# Patient Record
Sex: FEMALE | URBAN - METROPOLITAN AREA
[De-identification: names, ages, dates, MRNs, and addresses within clinical notes are randomized per-mention and may not be internally consistent; named-entity substitution may affect disease eponyms.]

---

## 2019-02-16 ENCOUNTER — EMERGENCY (EMERGENCY)
Facility: HOSPITAL | Age: 49
LOS: 1 days | Discharge: HOME | End: 2019-02-16
Attending: EMERGENCY MEDICINE
Payer: SELF-PAY

## 2019-02-16 VITALS
SYSTOLIC BLOOD PRESSURE: 121 MMHG | TEMPERATURE: 99 F | DIASTOLIC BLOOD PRESSURE: 71 MMHG | RESPIRATION RATE: 17 BRPM | HEART RATE: 99 BPM | OXYGEN SATURATION: 97 %

## 2019-02-16 VITALS
RESPIRATION RATE: 17 BRPM | OXYGEN SATURATION: 96 % | WEIGHT: 190.04 LBS | HEART RATE: 132 BPM | TEMPERATURE: 98 F | SYSTOLIC BLOOD PRESSURE: 90 MMHG | DIASTOLIC BLOOD PRESSURE: 53 MMHG | HEIGHT: 63 IN

## 2019-02-16 PROCEDURE — 99284 EMERGENCY DEPT VISIT MOD MDM: CPT | Mod: 25

## 2019-02-16 PROCEDURE — 96374 THER/PROPH/DIAG INJ IV PUSH: CPT

## 2019-02-16 PROCEDURE — 99285 EMERGENCY DEPT VISIT HI MDM: CPT

## 2019-02-16 PROCEDURE — 96375 TX/PRO/DX INJ NEW DRUG ADDON: CPT

## 2019-02-16 RX ORDER — SUCRALFATE 1 G
1 TABLET ORAL ONCE
Qty: 0 | Refills: 0 | Status: DISCONTINUED | OUTPATIENT
Start: 2019-02-16 | End: 2019-02-20

## 2019-02-16 RX ORDER — DIPHENHYDRAMINE HCL 50 MG
50 CAPSULE ORAL ONCE
Qty: 0 | Refills: 0 | Status: COMPLETED | OUTPATIENT
Start: 2019-02-16 | End: 2019-02-16

## 2019-02-16 RX ORDER — FAMOTIDINE 10 MG/ML
20 INJECTION INTRAVENOUS ONCE
Qty: 0 | Refills: 0 | Status: COMPLETED | OUTPATIENT
Start: 2019-02-16 | End: 2019-02-16

## 2019-02-16 RX ADMIN — Medication 125 MILLIGRAM(S): at 12:21

## 2019-02-16 RX ADMIN — Medication 50 MILLIGRAM(S): at 12:21

## 2019-02-16 RX ADMIN — FAMOTIDINE 20 MILLIGRAM(S): 10 INJECTION INTRAVENOUS at 12:21

## 2019-02-16 NOTE — ED ADULT NURSE NOTE - NSIMPLEMENTINTERV_GEN_ALL_ED
Implemented All Universal Safety Interventions:  [place] to call system. Call bell, personal items and telephone within reach. Instruct patient to call for assistance. Room bathroom lighting operational. Non-slip footwear when patient is off stretcher. Physically safe environment: no spills, clutter or unnecessary equipment. Stretcher in lowest position, wheels locked, appropriate side rails in place.

## 2019-02-16 NOTE — ED PROVIDER NOTE - CLINICAL SUMMARY MEDICAL DECISION MAKING FREE TEXT BOX
47 y/o F patient with an allergic reaction. Will do labs. Will give Solumedrol, Benadryl, Peptid and reassess.

## 2019-02-16 NOTE — ED ADULT TRIAGE NOTE - CHIEF COMPLAINT QUOTE
throat itchiness and rash all over the body, started yesterday morning, took benadryl and not working.

## 2019-02-16 NOTE — ED PROVIDER NOTE - PROGRESS NOTE DETAILS
On re-evaluation, Pt is well appearing, in NAD. pt breathing easily, moving air well. On re-evaluation, Pt is well appearing, in NAD. pt breathing easily, moving air well. tolerating po. vitals stable.  instructions given to follow up with primary physician in 1-2 days, return to ED for worsening condition. pt expresses understanding.

## 2019-02-16 NOTE — ED PROVIDER NOTE - OBJECTIVE STATEMENT
49 y/o F patient with no significant PMHx and no significant PSHx presents to the ED with c/o allergic reaction to Charles seeds today. Patient has diffused face and leg swelling. Patient is speaking with full sentences. Allergies: NKDA. 47 y/o F patient with no significant PMHx and no significant PSHx presents to the ED with c/o allergic reaction to Charles seeds today. Patient has diffused face rash and sensation of swelling as well as  rash on legs bilaterally and abdomen. Patient is speaking with full sentences. Pt breathing easily.  Allergies: NKDA.

## 2019-02-16 NOTE — ED PROVIDER NOTE - SKIN RASH DESCRIPTION
erythematous, blanching arms, abdomen, and legs/BLANCHING erythematous, blanching arms, abdomen, and legs/MACULAR/BLANCHING/PATCHY AREAS

## 2019-02-18 ENCOUNTER — EMERGENCY (EMERGENCY)
Facility: HOSPITAL | Age: 49
LOS: 1 days | Discharge: HOME | End: 2019-02-18
Attending: EMERGENCY MEDICINE
Payer: SELF-PAY

## 2019-02-18 VITALS
DIASTOLIC BLOOD PRESSURE: 70 MMHG | TEMPERATURE: 98 F | HEART RATE: 78 BPM | SYSTOLIC BLOOD PRESSURE: 130 MMHG | RESPIRATION RATE: 16 BRPM | OXYGEN SATURATION: 99 %

## 2019-02-18 VITALS
DIASTOLIC BLOOD PRESSURE: 86 MMHG | HEIGHT: 63 IN | WEIGHT: 190.04 LBS | OXYGEN SATURATION: 98 % | TEMPERATURE: 98 F | RESPIRATION RATE: 18 BRPM | SYSTOLIC BLOOD PRESSURE: 146 MMHG | HEART RATE: 89 BPM

## 2019-02-18 PROCEDURE — 99283 EMERGENCY DEPT VISIT LOW MDM: CPT

## 2019-02-18 RX ORDER — DIPHENHYDRAMINE HCL 50 MG
50 CAPSULE ORAL ONCE
Qty: 0 | Refills: 0 | Status: COMPLETED | OUTPATIENT
Start: 2019-02-18 | End: 2019-02-18

## 2019-02-18 RX ORDER — LORATADINE 10 MG/1
20 TABLET ORAL ONCE
Qty: 0 | Refills: 0 | Status: DISCONTINUED | OUTPATIENT
Start: 2019-02-18 | End: 2019-02-22

## 2019-02-18 RX ADMIN — Medication 50 MILLIGRAM(S): at 12:42

## 2019-02-18 NOTE — ED PROVIDER NOTE - CLINICAL SUMMARY MEDICAL DECISION MAKING FREE TEXT BOX
Give high dose of Zyrtec. See progress note for paper that describes the safety approach. Give high dose of Zyrtec. See progress note for paper that describes the safety of approach. refer to allergist

## 2019-02-18 NOTE — ED ADULT NURSE NOTE - OBJECTIVE STATEMENT
itchiness for 3 days, taking prednisone and benadryl as prescribed with relief for only 2 hrs than itchiness starts again, denies any shortness of breath.

## 2019-02-18 NOTE — ED PROVIDER NOTE - OBJECTIVE STATEMENT
49 y/o F with no PMHx or PSHx presents to ED c/o hives. Seen yesterday was DC on benadryl and steroids. Pt now endorses rash onset and some difficulty breathing. Pt now has a non-itchy rash with itchy eyes. Last took steroid overnight with benadryl NKDA.

## 2019-02-18 NOTE — ED ADULT NURSE NOTE - NSIMPLEMENTINTERV_GEN_ALL_ED
Implemented All Universal Safety Interventions:  [place] to call system. Call bell, personal items and telephone within reach. Instruct patient to call for assistance. Room bathroom lighting operational. Non-slip footwear when patient is off stretcher. Physically safe environment: no spills, clutter or unnecessary equipment. Stretcher in lowest position, wheels locked, appropriate side rails in place.

## 2020-04-06 NOTE — ED ADULT TRIAGE NOTE - CHIEF COMPLAINT QUOTE
Spoke with patient, got him scheduled for a video visit tomorrow to discuss shortness of breath. He will be downloading the Fingerprint faye today.    Gen body urticaria since 3 days

## 2020-08-18 NOTE — ED ADULT TRIAGE NOTE - BP NONINVASIVE SYSTOLIC (MM HG)
----- Message from Kandace Benedicto sent at 8/18/2020 11:52 AM CDT -----  Regarding: labs  Type: Patient Call Back    Who called:pt    What is the request in detail:pt is calling to request lab work for est care. Call pt    Can the clinic reply by [facility]?    Would the patient rather a call back or a response via [facility]? call    Best call back [phone] (home)     Additional Information:        
Labs ordered  Please schedule    
Labs scheduled.   
Patient is new to this clinic  Does patient have any medical conditions that I need to look out for or just regular labs?    
Patient requesting lab orders, please advise.   
Spoke to patient and he stated it is more for a overall work up. Just want to make sure his cholesterol and blood is ok requested labs are pended for approval. Please advise  
[phone]

## 2021-05-06 ENCOUNTER — EMERGENCY (EMERGENCY)
Facility: HOSPITAL | Age: 51
LOS: 1 days | Discharge: HOME | End: 2021-05-06
Attending: EMERGENCY MEDICINE
Payer: COMMERCIAL

## 2021-05-06 VITALS
DIASTOLIC BLOOD PRESSURE: 77 MMHG | OXYGEN SATURATION: 100 % | RESPIRATION RATE: 18 BRPM | HEART RATE: 70 BPM | SYSTOLIC BLOOD PRESSURE: 136 MMHG | TEMPERATURE: 98 F

## 2021-05-06 VITALS
OXYGEN SATURATION: 97 % | TEMPERATURE: 99 F | WEIGHT: 176.81 LBS | RESPIRATION RATE: 20 BRPM | HEART RATE: 90 BPM | DIASTOLIC BLOOD PRESSURE: 85 MMHG | SYSTOLIC BLOOD PRESSURE: 152 MMHG | HEIGHT: 63 IN

## 2021-05-06 LAB
ALBUMIN SERPL ELPH-MCNC: 3.4 G/DL — LOW (ref 3.5–5)
ALP SERPL-CCNC: 104 U/L — SIGNIFICANT CHANGE UP (ref 40–120)
ALT FLD-CCNC: 18 U/L DA — SIGNIFICANT CHANGE UP (ref 10–60)
ANION GAP SERPL CALC-SCNC: 7 MMOL/L — SIGNIFICANT CHANGE UP (ref 5–17)
AST SERPL-CCNC: 17 U/L — SIGNIFICANT CHANGE UP (ref 10–40)
BILIRUB SERPL-MCNC: 0.3 MG/DL — SIGNIFICANT CHANGE UP (ref 0.2–1.2)
BUN SERPL-MCNC: 10 MG/DL — SIGNIFICANT CHANGE UP (ref 7–18)
CALCIUM SERPL-MCNC: 9 MG/DL — SIGNIFICANT CHANGE UP (ref 8.4–10.5)
CHLORIDE SERPL-SCNC: 107 MMOL/L — SIGNIFICANT CHANGE UP (ref 96–108)
CO2 SERPL-SCNC: 26 MMOL/L — SIGNIFICANT CHANGE UP (ref 22–31)
CREAT SERPL-MCNC: 0.61 MG/DL — SIGNIFICANT CHANGE UP (ref 0.5–1.3)
GLUCOSE SERPL-MCNC: 130 MG/DL — HIGH (ref 70–99)
HCG UR QL: NEGATIVE — SIGNIFICANT CHANGE UP
HCT VFR BLD CALC: 37.8 % — SIGNIFICANT CHANGE UP (ref 34.5–45)
HGB BLD-MCNC: 13 G/DL — SIGNIFICANT CHANGE UP (ref 11.5–15.5)
MCHC RBC-ENTMCNC: 29.7 PG — SIGNIFICANT CHANGE UP (ref 27–34)
MCHC RBC-ENTMCNC: 34.4 GM/DL — SIGNIFICANT CHANGE UP (ref 32–36)
MCV RBC AUTO: 86.3 FL — SIGNIFICANT CHANGE UP (ref 80–100)
NRBC # BLD: 0 /100 WBCS — SIGNIFICANT CHANGE UP (ref 0–0)
PLATELET # BLD AUTO: 254 K/UL — SIGNIFICANT CHANGE UP (ref 150–400)
POTASSIUM SERPL-MCNC: 4 MMOL/L — SIGNIFICANT CHANGE UP (ref 3.5–5.3)
POTASSIUM SERPL-SCNC: 4 MMOL/L — SIGNIFICANT CHANGE UP (ref 3.5–5.3)
PROT SERPL-MCNC: 8 G/DL — SIGNIFICANT CHANGE UP (ref 6–8.3)
RBC # BLD: 4.38 M/UL — SIGNIFICANT CHANGE UP (ref 3.8–5.2)
RBC # FLD: 13.4 % — SIGNIFICANT CHANGE UP (ref 10.3–14.5)
SODIUM SERPL-SCNC: 140 MMOL/L — SIGNIFICANT CHANGE UP (ref 135–145)
WBC # BLD: 9.71 K/UL — SIGNIFICANT CHANGE UP (ref 3.8–10.5)
WBC # FLD AUTO: 9.71 K/UL — SIGNIFICANT CHANGE UP (ref 3.8–10.5)

## 2021-05-06 PROCEDURE — 96375 TX/PRO/DX INJ NEW DRUG ADDON: CPT

## 2021-05-06 PROCEDURE — 99284 EMERGENCY DEPT VISIT MOD MDM: CPT

## 2021-05-06 PROCEDURE — 96374 THER/PROPH/DIAG INJ IV PUSH: CPT

## 2021-05-06 PROCEDURE — 36415 COLL VENOUS BLD VENIPUNCTURE: CPT

## 2021-05-06 PROCEDURE — 96361 HYDRATE IV INFUSION ADD-ON: CPT

## 2021-05-06 PROCEDURE — 85027 COMPLETE CBC AUTOMATED: CPT

## 2021-05-06 PROCEDURE — 81025 URINE PREGNANCY TEST: CPT

## 2021-05-06 PROCEDURE — 99284 EMERGENCY DEPT VISIT MOD MDM: CPT | Mod: 25

## 2021-05-06 PROCEDURE — 80053 COMPREHEN METABOLIC PANEL: CPT

## 2021-05-06 RX ORDER — DEXAMETHASONE 0.5 MG/5ML
4 ELIXIR ORAL ONCE
Refills: 0 | Status: COMPLETED | OUTPATIENT
Start: 2021-05-06 | End: 2021-05-06

## 2021-05-06 RX ORDER — FAMOTIDINE 10 MG/ML
20 INJECTION INTRAVENOUS ONCE
Refills: 0 | Status: COMPLETED | OUTPATIENT
Start: 2021-05-06 | End: 2021-05-06

## 2021-05-06 RX ORDER — DIPHENHYDRAMINE HCL 50 MG
25 CAPSULE ORAL ONCE
Refills: 0 | Status: COMPLETED | OUTPATIENT
Start: 2021-05-06 | End: 2021-05-06

## 2021-05-06 RX ORDER — HYDROXYZINE HCL 10 MG
1 TABLET ORAL
Qty: 14 | Refills: 0
Start: 2021-05-06 | End: 2021-05-19

## 2021-05-06 RX ORDER — SODIUM CHLORIDE 9 MG/ML
1000 INJECTION INTRAMUSCULAR; INTRAVENOUS; SUBCUTANEOUS ONCE
Refills: 0 | Status: COMPLETED | OUTPATIENT
Start: 2021-05-06 | End: 2021-05-06

## 2021-05-06 RX ORDER — LORATADINE 10 MG/1
1 TABLET ORAL
Qty: 14 | Refills: 0
Start: 2021-05-06 | End: 2021-05-19

## 2021-05-06 RX ORDER — MOMETASONE FUROATE 1 MG/G
1 CREAM TOPICAL
Qty: 15 | Refills: 0
Start: 2021-05-06 | End: 2021-05-19

## 2021-05-06 RX ORDER — FAMOTIDINE 10 MG/ML
1 INJECTION INTRAVENOUS
Qty: 28 | Refills: 0
Start: 2021-05-06 | End: 2021-05-19

## 2021-05-06 RX ADMIN — SODIUM CHLORIDE 1000 MILLILITER(S): 9 INJECTION INTRAMUSCULAR; INTRAVENOUS; SUBCUTANEOUS at 14:28

## 2021-05-06 RX ADMIN — Medication 4 MILLIGRAM(S): at 13:10

## 2021-05-06 RX ADMIN — FAMOTIDINE 20 MILLIGRAM(S): 10 INJECTION INTRAVENOUS at 13:09

## 2021-05-06 RX ADMIN — SODIUM CHLORIDE 1000 MILLILITER(S): 9 INJECTION INTRAMUSCULAR; INTRAVENOUS; SUBCUTANEOUS at 13:10

## 2021-05-06 RX ADMIN — Medication 25 MILLIGRAM(S): at 13:10

## 2021-05-06 NOTE — ED PROVIDER NOTE - CLINICAL SUMMARY MEDICAL DECISION MAKING FREE TEXT BOX
IV hydration, basic labs, anti allergy medications and reevaluate IV hydration, basic labs, anti allergy medications and reevaluate.will dc pt on antihistamine.will follow up arranged by [facility] coordinator. allergist in 2 weeks

## 2021-05-06 NOTE — ED ADULT NURSE NOTE - OBJECTIVE STATEMENT
AOX4 +ambulatory patient reports she just moved to a new apt complaining of swelling of the face no sob speaking in full sentences

## 2021-05-06 NOTE — ED PROVIDER NOTE - OBJECTIVE STATEMENT
50 y.o female with no PMhx and no PSHx presents to the ED with recurrent itching and swelling of face, upper and lower eyelids for x5 days when she moved into her new apartment. Patient endorses she had a similar episode x1 year ago when she moved into a different apartment. Patient states she used OTC antihistamine medication with some relief. Patient denies any chest pain , shortness of breath, swelling of tongue or lips or any other acute complaints. NKDA

## 2021-05-06 NOTE — ED PROVIDER NOTE - PATIENT PORTAL LINK FT
You can access the FollowMyHealth Patient Portal offered by [facility] by registering at the following website: [email or web address]. By joining [employer]’s FollowMyHealth portal, you will also be able to view your health information using other applications (apps) compatible with our system.

## 2021-05-06 NOTE — ED ADULT TRIAGE NOTE - CHIEF COMPLAINT QUOTE
PT REPORTS MOVED INTO NEW APT ON SATURDAY. C/O SWELLING OF FACE, EYES AND ITCHING OF THROAT AND EYES X 4 DAYS

## 2024-02-17 ENCOUNTER — EMERGENCY (EMERGENCY)
Facility: HOSPITAL | Age: 54
LOS: 1 days | Discharge: HOME | End: 2024-02-17
Attending: STUDENT IN AN ORGANIZED HEALTH CARE EDUCATION/TRAINING PROGRAM | Admitting: STUDENT IN AN ORGANIZED HEALTH CARE EDUCATION/TRAINING PROGRAM
Payer: COMMERCIAL

## 2024-02-17 VITALS
SYSTOLIC BLOOD PRESSURE: 176 MMHG | TEMPERATURE: 98 F | RESPIRATION RATE: 16 BRPM | OXYGEN SATURATION: 100 % | DIASTOLIC BLOOD PRESSURE: 11 MMHG | HEART RATE: 99 BPM

## 2024-02-17 VITALS
TEMPERATURE: 98 F | DIASTOLIC BLOOD PRESSURE: 81 MMHG | RESPIRATION RATE: 16 BRPM | OXYGEN SATURATION: 100 % | HEART RATE: 90 BPM | SYSTOLIC BLOOD PRESSURE: 144 MMHG

## 2024-02-17 LAB
ALBUMIN SERPL ELPH-MCNC: 4.4 G/DL — SIGNIFICANT CHANGE UP (ref 3.3–5)
ALP SERPL-CCNC: 104 U/L — SIGNIFICANT CHANGE UP (ref 40–120)
ALT FLD-CCNC: 12 U/L — SIGNIFICANT CHANGE UP (ref 4–33)
ANION GAP SERPL CALC-SCNC: 14 MMOL/L — SIGNIFICANT CHANGE UP (ref 7–14)
APPEARANCE UR: CLEAR — SIGNIFICANT CHANGE UP
AST SERPL-CCNC: 15 U/L — SIGNIFICANT CHANGE UP (ref 4–32)
B BURGDOR C6 AB SER-ACNC: NEGATIVE — SIGNIFICANT CHANGE UP
B BURGDOR IGG+IGM SER-ACNC: 0.12 INDEX — SIGNIFICANT CHANGE UP (ref 0.01–0.89)
BACTERIA # UR AUTO: NEGATIVE /HPF — SIGNIFICANT CHANGE UP
BASE EXCESS BLDV CALC-SCNC: 2.9 MMOL/L — SIGNIFICANT CHANGE UP (ref -2–3)
BASOPHILS # BLD AUTO: 0.03 K/UL — SIGNIFICANT CHANGE UP (ref 0–0.2)
BASOPHILS NFR BLD AUTO: 0.3 % — SIGNIFICANT CHANGE UP (ref 0–2)
BILIRUB SERPL-MCNC: 0.4 MG/DL — SIGNIFICANT CHANGE UP (ref 0.2–1.2)
BILIRUB UR-MCNC: NEGATIVE — SIGNIFICANT CHANGE UP
BLOOD GAS VENOUS COMPREHENSIVE RESULT: SIGNIFICANT CHANGE UP
BUN SERPL-MCNC: 8 MG/DL — SIGNIFICANT CHANGE UP (ref 7–23)
CALCIUM SERPL-MCNC: 9.2 MG/DL — SIGNIFICANT CHANGE UP (ref 8.4–10.5)
CAST: 1 /LPF — SIGNIFICANT CHANGE UP (ref 0–4)
CHLORIDE BLDV-SCNC: 102 MMOL/L — SIGNIFICANT CHANGE UP (ref 96–108)
CHLORIDE SERPL-SCNC: 100 MMOL/L — SIGNIFICANT CHANGE UP (ref 98–107)
CO2 BLDV-SCNC: 31.5 MMOL/L — HIGH (ref 22–26)
CO2 SERPL-SCNC: 26 MMOL/L — SIGNIFICANT CHANGE UP (ref 22–31)
COLOR SPEC: YELLOW — SIGNIFICANT CHANGE UP
CREAT SERPL-MCNC: 0.59 MG/DL — SIGNIFICANT CHANGE UP (ref 0.5–1.3)
CRP SERPL-MCNC: 10.6 MG/L — HIGH
DIFF PNL FLD: NEGATIVE — SIGNIFICANT CHANGE UP
EGFR: 109 ML/MIN/1.73M2 — SIGNIFICANT CHANGE UP
EOSINOPHIL # BLD AUTO: 0.06 K/UL — SIGNIFICANT CHANGE UP (ref 0–0.5)
EOSINOPHIL NFR BLD AUTO: 0.5 % — SIGNIFICANT CHANGE UP (ref 0–6)
ERYTHROCYTE [SEDIMENTATION RATE] IN BLOOD: 12 MM/HR — SIGNIFICANT CHANGE UP (ref 4–25)
GAS PNL BLDV: 137 MMOL/L — SIGNIFICANT CHANGE UP (ref 136–145)
GLUCOSE BLDV-MCNC: 206 MG/DL — HIGH (ref 70–99)
GLUCOSE SERPL-MCNC: 198 MG/DL — HIGH (ref 70–99)
GLUCOSE UR QL: NEGATIVE MG/DL — SIGNIFICANT CHANGE UP
HCO3 BLDV-SCNC: 30 MMOL/L — HIGH (ref 22–29)
HCT VFR BLD CALC: 41.1 % — SIGNIFICANT CHANGE UP (ref 34.5–45)
HCT VFR BLDA CALC: 43 % — SIGNIFICANT CHANGE UP (ref 34.5–46.5)
HGB BLD CALC-MCNC: 14.3 G/DL — SIGNIFICANT CHANGE UP (ref 11.7–16.1)
HGB BLD-MCNC: 13.8 G/DL — SIGNIFICANT CHANGE UP (ref 11.5–15.5)
IANC: 7.55 K/UL — HIGH (ref 1.8–7.4)
IMM GRANULOCYTES NFR BLD AUTO: 0.4 % — SIGNIFICANT CHANGE UP (ref 0–0.9)
KETONES UR-MCNC: 15 MG/DL
LACTATE BLDV-MCNC: 2.3 MMOL/L — HIGH (ref 0.5–2)
LEUKOCYTE ESTERASE UR-ACNC: NEGATIVE — SIGNIFICANT CHANGE UP
LYMPHOCYTES # BLD AUTO: 2.85 K/UL — SIGNIFICANT CHANGE UP (ref 1–3.3)
LYMPHOCYTES # BLD AUTO: 25.4 % — SIGNIFICANT CHANGE UP (ref 13–44)
MCHC RBC-ENTMCNC: 27.9 PG — SIGNIFICANT CHANGE UP (ref 27–34)
MCHC RBC-ENTMCNC: 33.6 GM/DL — SIGNIFICANT CHANGE UP (ref 32–36)
MCV RBC AUTO: 83.2 FL — SIGNIFICANT CHANGE UP (ref 80–100)
MONOCYTES # BLD AUTO: 0.69 K/UL — SIGNIFICANT CHANGE UP (ref 0–0.9)
MONOCYTES NFR BLD AUTO: 6.1 % — SIGNIFICANT CHANGE UP (ref 2–14)
NEUTROPHILS # BLD AUTO: 7.55 K/UL — HIGH (ref 1.8–7.4)
NEUTROPHILS NFR BLD AUTO: 67.3 % — SIGNIFICANT CHANGE UP (ref 43–77)
NITRITE UR-MCNC: NEGATIVE — SIGNIFICANT CHANGE UP
NRBC # BLD: 0 /100 WBCS — SIGNIFICANT CHANGE UP (ref 0–0)
NRBC # FLD: 0 K/UL — SIGNIFICANT CHANGE UP (ref 0–0)
PCO2 BLDV: 54 MMHG — HIGH (ref 39–52)
PH BLDV: 7.35 — SIGNIFICANT CHANGE UP (ref 7.32–7.43)
PH UR: 6.5 — SIGNIFICANT CHANGE UP (ref 5–8)
PLATELET # BLD AUTO: 259 K/UL — SIGNIFICANT CHANGE UP (ref 150–400)
PO2 BLDV: <20 MMHG — LOW (ref 25–45)
POTASSIUM BLDV-SCNC: 4.8 MMOL/L — SIGNIFICANT CHANGE UP (ref 3.5–5.1)
POTASSIUM SERPL-MCNC: 4.8 MMOL/L — SIGNIFICANT CHANGE UP (ref 3.5–5.3)
POTASSIUM SERPL-SCNC: 4.8 MMOL/L — SIGNIFICANT CHANGE UP (ref 3.5–5.3)
PROT SERPL-MCNC: 8.2 G/DL — SIGNIFICANT CHANGE UP (ref 6–8.3)
PROT UR-MCNC: 30 MG/DL
RBC # BLD: 4.94 M/UL — SIGNIFICANT CHANGE UP (ref 3.8–5.2)
RBC # FLD: 13.2 % — SIGNIFICANT CHANGE UP (ref 10.3–14.5)
RBC CASTS # UR COMP ASSIST: 1 /HPF — SIGNIFICANT CHANGE UP (ref 0–4)
SAO2 % BLDV: 27.3 % — LOW (ref 67–88)
SODIUM SERPL-SCNC: 140 MMOL/L — SIGNIFICANT CHANGE UP (ref 135–145)
SP GR SPEC: 1.01 — SIGNIFICANT CHANGE UP (ref 1–1.03)
SQUAMOUS # UR AUTO: 3 /HPF — SIGNIFICANT CHANGE UP (ref 0–5)
UROBILINOGEN FLD QL: 0.2 MG/DL — SIGNIFICANT CHANGE UP (ref 0.2–1)
WBC # BLD: 11.22 K/UL — HIGH (ref 3.8–10.5)
WBC # FLD AUTO: 11.22 K/UL — HIGH (ref 3.8–10.5)
WBC UR QL: 1 /HPF — SIGNIFICANT CHANGE UP (ref 0–5)

## 2024-02-17 PROCEDURE — 70470 CT HEAD/BRAIN W/O & W/DYE: CPT | Mod: 26,MA

## 2024-02-17 PROCEDURE — 99285 EMERGENCY DEPT VISIT HI MDM: CPT

## 2024-02-17 RX ORDER — ACETAMINOPHEN 500 MG
1000 TABLET ORAL ONCE
Refills: 0 | Status: COMPLETED | OUTPATIENT
Start: 2024-02-17 | End: 2024-02-17

## 2024-02-17 RX ORDER — VALACYCLOVIR 500 MG/1
1 TABLET, FILM COATED ORAL
Qty: 21 | Refills: 0
Start: 2024-02-17 | End: 2024-02-23

## 2024-02-17 RX ORDER — KETOROLAC TROMETHAMINE 30 MG/ML
15 SYRINGE (ML) INJECTION ONCE
Refills: 0 | Status: DISCONTINUED | OUTPATIENT
Start: 2024-02-17 | End: 2024-02-17

## 2024-02-17 RX ORDER — SODIUM CHLORIDE 9 MG/ML
1000 INJECTION INTRAMUSCULAR; INTRAVENOUS; SUBCUTANEOUS ONCE
Refills: 0 | Status: COMPLETED | OUTPATIENT
Start: 2024-02-17 | End: 2024-02-17

## 2024-02-17 RX ORDER — VALACYCLOVIR 500 MG/1
1000 TABLET, FILM COATED ORAL ONCE
Refills: 0 | Status: COMPLETED | OUTPATIENT
Start: 2024-02-17 | End: 2024-02-17

## 2024-02-17 RX ORDER — METOCLOPRAMIDE HCL 10 MG
10 TABLET ORAL ONCE
Refills: 0 | Status: COMPLETED | OUTPATIENT
Start: 2024-02-17 | End: 2024-02-17

## 2024-02-17 RX ADMIN — Medication 10 MILLIGRAM(S): at 13:28

## 2024-02-17 RX ADMIN — Medication 15 MILLIGRAM(S): at 13:26

## 2024-02-17 RX ADMIN — VALACYCLOVIR 1000 MILLIGRAM(S): 500 TABLET, FILM COATED ORAL at 13:39

## 2024-02-17 RX ADMIN — SODIUM CHLORIDE 1000 MILLILITER(S): 9 INJECTION INTRAMUSCULAR; INTRAVENOUS; SUBCUTANEOUS at 10:28

## 2024-02-17 RX ADMIN — Medication 15 MILLIGRAM(S): at 14:15

## 2024-02-17 RX ADMIN — SODIUM CHLORIDE 1000 MILLILITER(S): 9 INJECTION INTRAMUSCULAR; INTRAVENOUS; SUBCUTANEOUS at 11:09

## 2024-02-17 RX ADMIN — Medication 1000 MILLIGRAM(S): at 11:09

## 2024-02-17 RX ADMIN — Medication 60 MILLIGRAM(S): at 10:10

## 2024-02-17 RX ADMIN — Medication 400 MILLIGRAM(S): at 10:10

## 2024-02-17 NOTE — ED ADULT TRIAGE NOTE - CHIEF COMPLAINT QUOTE
c/o left sided headache x 4 days. States yesterday right eye swelling and left eye tearing. Pt unable to blink left eye. Fingerstick 193. Denies any PHx. c/o left sided headache x 4 days. States yesterday right eye swelling and left eye tearing. Pt unable to blink left eye. Fingerstick 193. Denies any PHx.Dr. Garzon came to triage to [facility]. No stroke code activated. Possible [place] Palsy.

## 2024-02-17 NOTE — ED PROVIDER NOTE - PATIENT PORTAL LINK FT
You can access the FollowMyHealth Patient Portal offered by [facility] by registering at the following website: [email or web address]. By joining [employer]’s FollowMyHealth portal, you will also be able to view your health information using other applications (apps) compatible with our system.

## 2024-02-17 NOTE — ED ADULT NURSE NOTE - OBJECTIVE STATEMENT
pt alert,oriented x3. reports headache x past  4 days with facial droop since yesterday and inability to blink l eye  since today.  tearing noted from l eye at present.  pt evaluated by md. awaiting ordere=s. will continue to monitor

## 2024-02-17 NOTE — ED PROVIDER NOTE - ATTENDING APP SHARED VISIT CONTRIBUTION OF CARE
52 yo F no known pmhx (has not followed with a doctor in a long time), presenting with complaints of HA for the past few days, with development of L sided facial droop and decreased lid closure that started last night. She first noticed drooling in the middle of the night when rinsing her mouth out. Denies any other focal weakness/numbness/tingling. No preceding viral infections, no rashes. Headache is different than her headaches in the past.     VITALS: reviewed  GEN: NAD, A & O x 4  HEAD/EYES: NCAT, PERRL, EOMI, anicteric sclerae, decreased blinking on L and decreased lid closure  ENT: mucus membranes moist, oropharynx WNL, trachea midline  RESP: lungs CTA with equal breath sounds bilaterally, chest wall nontender and atraumatic  CV: heart with reg rhythm S1, S2, no murmur; distal pulses intact and symmetric bilaterally  ABDOMEN: normoactive bowel sounds, soft, nondistended, nontender, no palpable masses  : no CVAT  MSK: extremities atraumatic and nontender, no edema, no asymmetry.  SKIN: warm, dry, no rash, no bruising, no cyanosis. color appropriate for ethnicity  NEURO: alert, mentating appropriately, no facial asymmetry. gross sensation, motor, coordination are intact, asymmetric eyebrow raise, L facial droop   PSYCH: Affect appropriate    Symptoms consistent with Bell's Palsy, per PA exam no noted lesions in the ear, pt without visible rash. Will obtain labs, including Lyme titers, CTH and reassess, likely dc home with steroids and antivirals

## 2024-02-17 NOTE — ED PROVIDER NOTE - NS ED MD DISPO DISCHARGE
Called patient to notify of [employer] Medicare Complete insurance benefits for hearing aids - Discount through [employer] Reference #[id number]. Spoke with patient regarding these details; he reported understanding but stated he is not interested in a hearing aid at this time.      Home

## 2024-02-17 NOTE — ED PROVIDER NOTE - PROGRESS NOTE DETAILS
CRISTIANE Church: Labs reviewed, states she feels better. Stable for dc home. Rx Valtrex, prednisone, artificial tears. Neuro referral. PMD follow up. Strict return precautions.

## 2024-02-17 NOTE — ED ADULT NURSE NOTE - NSFALLUNIVINTERV_ED_ALL_ED
Bed/Stretcher in lowest position, wheels locked, appropriate side rails in place/Call bell, personal items and telephone in reach/Instruct patient to call for assistance before getting out of bed/chair/stretcher/Non-slip footwear applied when patient is off stretcher/[place] to call system/Physically safe environment - no spills, clutter or unnecessary equipment/Purposeful proactive rounding/Room/bathroom lighting operational, light cord in reach

## 2024-02-17 NOTE — ED PROVIDER NOTE - NSFOLLOWUPINSTRUCTIONS_ED_ALL_ED_FT
Please take Valtrex 1g three times a day for a week.  Please take Prednisone 60mg once a day for a week.  Please take Tylenol 650mg every 6 hours as needed for headache.  Please Use artificial tears for eye dryness and use eye closure at bedtime.    Follow up with your PMD within 1-2 days or you can call our clinic at [phone] for an appointment  Take all of your other medications as previously prescribed.  Worsening, continued or ANY new concerning symptoms return to the Emergency Department.    Please follow up with Neurology as scheduled.    [place] Palsy    WHAT YOU NEED TO KNOW:    What is Bell palsy? Bell palsy is a sudden weakness or paralysis of one side of your face. It occurs when the nerve that controls the muscles in your face becomes swollen or irritated. Bell palsy usually lasts about 2 to 3 weeks, but it can last for up to 6 months. Bell palsy can be permanent for some people. The cause of Bell palsy is not clear.  [place] Palsy    What increases my risk for Bell palsy?    Age 15 to 45 years    Pregnancy or preeclampsia (high blood pressure that develops because of pregnancy)    A virus, such as a cold, the flu, herpes simplex, or varicella (chicken pox)    Obesity    High blood pressure    Stress, lack of sleep, injury, or a short illness    A condition such as lupus, Lyme disease, Sjögren syndrome, or diabetes  What are the signs and symptoms of Bell palsy? You may first have pain behind an ear or in your face. Hours or days later, you may have any of the following on the same side of your face:    Weakness or paralysis in your face    Not being able to move your eyebrow or wrinkle your forehead    Trouble closing your eye or blinking, or your eye moves up when you try to close your eyelid    Changes in the amount of tears and saliva you make, such as dry eyes or drooling    Mouth drooping and trouble smiling or chewing    Loss of taste at the front part of your tongue    Sensitive hearing  How is Bell palsy diagnosed? Your healthcare provider will examine you and ask about your medical history. Tell your provider about your symptoms and when they started. Your provider will test how well you can move the muscles in your face. Your provider will need to rule out other causes of paralysis, such as a stroke. Some stroke and Bell palsy symptoms are similar, but only Bell palsy prevents movement of forehead muscles. Bell palsy only affects your face. You may also need any of the following:    An electromyography (EMG) may be used to measure the electrical activity of your muscles. An EMG also tests the nerves that control muscles.    A CT or MRI of your brain may be done to rule out other causes of your paralysis. You may be given contrast liquid to help your brain show up better in the pictures. Tell the healthcare provider if you have ever had an allergic reaction to contrast liquid. Do not enter the MRI room with anything metal. Metal can cause serious injury. Tell the provider if you have any metal in or on your body.  How is Bell palsy treated? Bell palsy often goes away without treatment. Some treatments may help you get better faster or help prevent other problems caused by Bell palsy. You may need any of the following:    Steroids may be given to decrease swelling and irritation of the nerve in your face. Your symptoms can go away faster if you get steroids 72 hours from when your paralysis started.    Antiviral medicine may be given if your provider thinks a virus caused your Bell palsy.    Acetaminophen decreases pain and fever. It is available without a doctor's order. Ask how much to take and how often to take it. Follow directions. Read the labels of all other medicines you are using to see if they also contain acetaminophen, or ask your doctor or pharmacist. Acetaminophen can cause liver damage if not taken correctly.    NSAIDs, such as ibuprofen, help decrease swelling, pain, and fever. This medicine is available with or without a doctor's order. NSAIDs can cause stomach bleeding or kidney problems in certain people. If you take blood thinner medicine, always ask your healthcare provider if NSAIDs are safe for you. Always read the medicine label and follow directions.  What else can I do to help manage Bell palsy?    Eye care may be needed to prevent vision changes, eye damage, and infection. Use eye drops during the day and an ointment at night, as directed. You may need to wear an eye patch during the day. Wear sunglasses to protect your eye from direct sunlight. Stay away from places that have particles in the air that may harm your eye. You may also need to tape your eye shut while you sleep. Get your eye checked as directed if your symptoms last longer than 3 weeks.  Eye Patch      Eat soft foods that are easy to chew and swallow. These foods may be chopped, ground, mashed, pureed, and moist. Do not eat hard or chewy foods. These foods may fall out of your mouth where it droops. Ask your healthcare provider or dietitian about the foods you should eat.    Go to speech therapy if you have trouble eating or drinking that continues longer than 3 weeks. A speech therapist can teach you new ways to eat and drink. The therapist can show you ways to prevent or manage problems with drooling or swallowing. You may also learn to plan several small meals instead of a few large meals each day.    Use ear plugs or ear protectors around loud noises, such as a lawnmower or loud music. Foam earplugs that completely block your ear canal can help decrease your sensitive hearing. Do not listen to loud music through headphones or earphones.    Go to physical therapy as directed. A physical therapist can teach you how to massage and exercise the muscles in your face. These exercises may help prevent long-term problems such as muscle spasms and permanent paralysis in your face.    Talk to a mental health therapist if your symptoms are causing a low mood or anxiety. The therapist can help you cope while you recover.  When should I seek immediate care?    You have vision changes or a loss of vision.    When should I call my doctor?    You have a fever.    Your eye becomes red, irritated, or painful.    Your symptoms have not gone away after 3 weeks.    You have questions or concerns about your condition or care.

## 2024-02-17 NOTE — ED PROVIDER NOTE - OBJECTIVE STATEMENT
53 year old female with no known PMH presents to the ED complaining of facial droop since yesterday. Pt states she woke up yesterday morning with left eye dryness and inability to close it. then later she noticed right sided facial droop. She states suffers from migraine headaches but now complains of left sided headache that is different from his usual headache. She denies any slurred speech, upper and lower extremity weakness, numbness or tingling sensation, neck pain, fevers, chills, or any other associated complaints. Denies sick contact, recent travel.

## 2024-02-17 NOTE — ED PROVIDER NOTE - CLINICAL SUMMARY MEDICAL DECISION MAKING FREE TEXT BOX
53 year old female with no known PMH presents to the ED complaining of facial droop since yesterday. Pt states she woke up yesterday morning with left eye dryness and inability to close it. VS reviewed, hypertensive. Neuro exam notable for left sided facial weakness. Imp: suspect acute onset of Bell's palsy, no clinical evidence suggestive of stroke. Plan for labs, steroids, CT head, reassess.

## 2024-02-17 NOTE — ED ADULT NURSE NOTE - CHIEF COMPLAINT QUOTE
c/o left sided headache x 4 days. States yesterday right eye swelling and left eye tearing. Pt unable to blink left eye. Fingerstick 193. Denies any PHx.Dr. Garzon came to triage to [facility]. No stroke code activated. Possible [place] Palsy.

## 2024-02-21 ENCOUNTER — APPOINTMENT (OUTPATIENT)
Dept: INTERNAL MEDICINE | Facility: CLINIC | Age: 54
End: 2024-02-21

## 2024-02-22 ENCOUNTER — APPOINTMENT (OUTPATIENT)
Dept: INTERNAL MEDICINE | Facility: CLINIC | Age: 54
End: 2024-02-22
Payer: COMMERCIAL

## 2024-02-22 ENCOUNTER — OUTPATIENT (OUTPATIENT)
Dept: OUTPATIENT SERVICES | Facility: HOSPITAL | Age: 54
LOS: 1 days | End: 2024-02-22
Payer: COMMERCIAL

## 2024-02-22 ENCOUNTER — APPOINTMENT (OUTPATIENT)
Age: 54
End: 2024-02-22

## 2024-02-22 VITALS
HEART RATE: 105 BPM | OXYGEN SATURATION: 98 % | SYSTOLIC BLOOD PRESSURE: 152 MMHG | BODY MASS INDEX: 30.83 KG/M2 | DIASTOLIC BLOOD PRESSURE: 84 MMHG | WEIGHT: 174 LBS | HEIGHT: 63 IN

## 2024-02-22 DIAGNOSIS — G51.0 BELL'S PALSY: ICD-10-CM

## 2024-02-22 DIAGNOSIS — Z00.00 ENCOUNTER FOR GENERAL ADULT MEDICAL EXAMINATION W/OUT ABNORMAL FINDINGS: ICD-10-CM

## 2024-02-22 DIAGNOSIS — Z82.49 FAMILY HISTORY OF ISCHEMIC HEART DISEASE AND OTHER DISEASES OF THE CIRCULATORY SYSTEM: ICD-10-CM

## 2024-02-22 PROCEDURE — 85027 COMPLETE CBC AUTOMATED: CPT

## 2024-02-22 PROCEDURE — G0463: CPT

## 2024-02-22 PROCEDURE — 83036 HEMOGLOBIN GLYCOSYLATED A1C: CPT

## 2024-02-22 PROCEDURE — 80061 LIPID PANEL: CPT

## 2024-02-22 PROCEDURE — 99215 OFFICE O/P EST HI 40 MIN: CPT | Mod: GC

## 2024-02-22 PROCEDURE — 80053 COMPREHEN METABOLIC PANEL: CPT

## 2024-02-23 ENCOUNTER — NON-APPOINTMENT (OUTPATIENT)
Age: 54
End: 2024-02-23

## 2024-02-23 DIAGNOSIS — I10 ESSENTIAL (PRIMARY) HYPERTENSION: ICD-10-CM

## 2024-02-26 LAB
ALBUMIN SERPL ELPH-MCNC: 4.5 G/DL
ALP BLD-CCNC: 114 U/L
ALT SERPL-CCNC: 12 U/L
ANION GAP SERPL CALC-SCNC: 18 MMOL/L
AST SERPL-CCNC: 12 U/L
BILIRUB SERPL-MCNC: 0.2 MG/DL
BUN SERPL-MCNC: 16 MG/DL
CALCIUM SERPL-MCNC: 9 MG/DL
CHLORIDE SERPL-SCNC: 98 MMOL/L
CHOLEST SERPL-MCNC: 283 MG/DL
CO2 SERPL-SCNC: 20 MMOL/L
CREAT SERPL-MCNC: 0.81 MG/DL
EGFR: 88 ML/MIN/1.73M2
ESTIMATED AVERAGE GLUCOSE: 214 MG/DL
GLUCOSE SERPL-MCNC: 495 MG/DL
HBA1C MFR BLD HPLC: 9.1 %
HCT VFR BLD CALC: 42.2 %
HDLC SERPL-MCNC: 54 MG/DL
HGB BLD-MCNC: 14.6 G/DL
LDLC SERPL CALC-MCNC: 168 MG/DL
MCHC RBC-ENTMCNC: 28.9 PG
MCHC RBC-ENTMCNC: 34.6 GM/DL
MCV RBC AUTO: 83.4 FL
NONHDLC SERPL-MCNC: 230 MG/DL
PLATELET # BLD AUTO: 319 K/UL
POTASSIUM SERPL-SCNC: 4.3 MMOL/L
PROT SERPL-MCNC: 7.9 G/DL
RBC # BLD: 5.06 M/UL
RBC # FLD: 13.8 %
SODIUM SERPL-SCNC: 136 MMOL/L
TRIGL SERPL-MCNC: 323 MG/DL
WBC # FLD AUTO: 14.49 K/UL

## 2024-02-28 NOTE — HISTORY OF PRESENT ILLNESS
[FreeTextEntry1] : MANUELA [de-identified] : 53 year old F with no past medical history, recent ED visit on 2/17/2024 for unilateral asymmetric facial droop presents to establish care.  The patient reports on Friday 2/16 she began having facial numbness and increased lacrimation. The following morning on 2/17 AM she went to the [facility] ED and was found to have unilateral R sided facial droop, with negative CTH, suggestive of Bell's Palsy. The patient was also noted to be hypertensive to SBP 170s. Was discharged on 1 wk course of prednisone 60 mg and valtrex.   Presently, the patient reports she feels well. She is adherent to her medications, is practicing eye care. She is planning to go tonight to [place] to be treated by her cousin who is an herbalist doctor. She reports incredible amounts of stress recently because it's nearing the one-year larisa since the tragic passing of her son.  The patient reports no medical history, last saw a PCP 2 years ago, was up to date on vaccines. Takes no medications.

## 2024-02-28 NOTE — PHYSICAL EXAM
[Normal] : no jugular venous distention, supple, no lymphadenopathy and the thyroid was normal and there were no nodules present [de-identified] : +bilateral arcuate senilis [de-identified] : obese [de-identified] : complete L sided facial paralysis, facial droop, profoound assymetry

## 2024-02-28 NOTE — ASSESSMENT
[FreeTextEntry1] : 53 F with no known PMH who presents to establish care after a recent ED visit where she was found to have new onset Bell's Palsy  #Bell's palsy - continue with prednisone and valacyclovir x 7 days - eye care with patch, artificial tears  #HTN - BP elevated at ED and in office today (146/82 on repeat) - instructed pt to check BP at home daily until next visit, cuff Rx sent - will bring log to next visit, amenable to starting medication if needed  #HCM - up to date on vaccines, does not want flu vaccine - interested in age appropriate screening; however, prefers to wait until next visit since she's leaving for [place] today - labs today: CBC, CMP, A1C, lipid profile, will discuss results at next visit (3/29/2024)  RTC in 5 weeks for follow up on HCM, HTN, and bells palsy

## 2024-02-28 NOTE — HEALTH RISK ASSESSMENT
[Good] : ~his/her~ current health as good [Fair] :  ~his/her~ mood as fair [Intercurrent ED visits] : went to ED [Monthly or less (1 pt)] : Monthly or less (1 point) [1 or 2 (0 pts)] : 1 or 2 (0 points) [Never (0 pts)] : Never (0 points) [No] : In the past 12 months have you used drugs other than those required for medical reasons? No [No falls in past year] : Patient reported no falls in the past year [0] : 1) Little interest or pleasure doing things: Not at all (0) [1] : 2) Feeling down, depressed, or hopeless for several days (1) [PHQ-2 Negative - No further assessment needed] : PHQ-2 Negative - No further assessment needed [Patient reported mammogram was normal] : Patient reported mammogram was normal [None] : None [With Family] : lives with family [Employed] : employed [High School] : high school [] :  [Fully functional (using the telephone, shopping, preparing meals, housekeeping, doing laundry, using] : Fully functional and needs no help or supervision to perform IADLs (using the telephone, shopping, preparing meals, housekeeping, doing laundry, using transportation, managing medications and managing finances) [Reports changes in vision] : Reports changes in vision [Reports normal functional visual acuity (ie: able to read med bottle)] : Reports normal functional visual acuity [Never] : Never [de-identified] : 2/16 [facility] ED, as above [Audit-CScore] : 1 [de-identified] : does not exercise intentionally, walks a lot daily as part of her work [de-identified] : carb heavy - rice, breads, lentils, curries, soups, lots of fresh vegetables [[id number]] : 1 [Change in mental status noted] : No change in mental status noted [Language] : denies difficulty with language [Behavior] : denies difficulty with behavior [Handling Complex Tasks] : denies difficulty handling complex tasks [Reasoning] : denies difficulty with reasoning [Sexually Active] : not sexually active [Reports changes in hearing] : Reports no changes in hearing [MammogramDate] : 2021 [ColonoscopyDate] : N/A [de-identified] : lives with many members of extended family [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

## 2024-03-04 DIAGNOSIS — Z82.49 FAMILY HISTORY OF ISCHEMIC HEART DISEASE AND OTHER DISEASES OF THE CIRCULATORY SYSTEM: ICD-10-CM

## 2024-03-04 DIAGNOSIS — G51.0 BELL'S PALSY: ICD-10-CM

## 2024-03-29 ENCOUNTER — OUTPATIENT (OUTPATIENT)
Dept: OUTPATIENT SERVICES | Facility: HOSPITAL | Age: 54
LOS: 1 days | End: 2024-03-29
Payer: COMMERCIAL

## 2024-03-29 ENCOUNTER — APPOINTMENT (OUTPATIENT)
Dept: INTERNAL MEDICINE | Facility: CLINIC | Age: 54
End: 2024-03-29
Payer: COMMERCIAL

## 2024-03-29 VITALS
WEIGHT: 176 LBS | BODY MASS INDEX: 31.18 KG/M2 | HEART RATE: 86 BPM | SYSTOLIC BLOOD PRESSURE: 140 MMHG | OXYGEN SATURATION: 98 % | HEIGHT: 63 IN | DIASTOLIC BLOOD PRESSURE: 80 MMHG

## 2024-03-29 DIAGNOSIS — I10 ESSENTIAL (PRIMARY) HYPERTENSION: ICD-10-CM

## 2024-03-29 LAB
ALBUMIN SERPL ELPH-MCNC: 4.4 G/DL
ALP BLD-CCNC: 109 U/L
ALT SERPL-CCNC: 11 U/L
ANION GAP SERPL CALC-SCNC: 12 MMOL/L
AST SERPL-CCNC: 14 U/L
BILIRUB SERPL-MCNC: 0.3 MG/DL
BUN SERPL-MCNC: 12 MG/DL
CALCIUM SERPL-MCNC: 9.2 MG/DL
CHLORIDE SERPL-SCNC: 105 MMOL/L
CO2 SERPL-SCNC: 25 MMOL/L
CREAT SERPL-MCNC: 0.63 MG/DL
EGFR: 107 ML/MIN/1.73M2
GLUCOSE SERPL-MCNC: 171 MG/DL
POTASSIUM SERPL-SCNC: 4.3 MMOL/L
PROT SERPL-MCNC: 7.2 G/DL
SODIUM SERPL-SCNC: 142 MMOL/L

## 2024-03-29 PROCEDURE — 80053 COMPREHEN METABOLIC PANEL: CPT

## 2024-03-29 PROCEDURE — 99213 OFFICE O/P EST LOW 20 MIN: CPT | Mod: GE

## 2024-03-29 PROCEDURE — G0463: CPT

## 2024-03-29 PROCEDURE — 82043 UR ALBUMIN QUANTITATIVE: CPT

## 2024-03-29 RX ORDER — VALACYCLOVIR 1 G/1
1 TABLET, FILM COATED ORAL 3 TIMES DAILY
Qty: 21 | Refills: 0 | Status: DISCONTINUED | COMMUNITY
Start: 2024-02-22 | End: 2024-03-29

## 2024-03-29 RX ORDER — BLOOD-GLUCOSE METER
EACH MISCELLANEOUS
Qty: 3 | Refills: 0 | Status: ACTIVE | COMMUNITY
Start: 2024-03-29 | End: 1900-01-01

## 2024-03-29 RX ORDER — BLOOD-GLUCOSE METER
KIT MISCELLANEOUS
Qty: 2 | Refills: 12 | Status: ACTIVE | COMMUNITY
Start: 2024-03-29 | End: 1900-01-01

## 2024-03-29 RX ORDER — BLOOD SUGAR DIAGNOSTIC
STRIP MISCELLANEOUS
Qty: 270 | Refills: 3 | Status: ACTIVE | COMMUNITY
Start: 2024-03-29 | End: 1900-01-01

## 2024-03-29 RX ORDER — BLOOD-GLUCOSE METER
W/DEVICE KIT MISCELLANEOUS
Qty: 1 | Refills: 0 | Status: ACTIVE | COMMUNITY
Start: 2024-03-29 | End: 1900-01-01

## 2024-03-29 RX ORDER — ISOPROPYL ALCOHOL 70 ML/100ML
SWAB TOPICAL
Qty: 1 | Refills: 3 | Status: ACTIVE | COMMUNITY
Start: 2024-03-29 | End: 1900-01-01

## 2024-03-29 RX ORDER — LANCETS 33 GAUGE
EACH MISCELLANEOUS
Qty: 90 | Refills: 3 | Status: ACTIVE | COMMUNITY
Start: 2024-03-29 | End: 1900-01-01

## 2024-03-29 RX ORDER — PREDNISONE 20 MG/1
20 TABLET ORAL DAILY
Qty: 21 | Refills: 0 | Status: DISCONTINUED | COMMUNITY
Start: 2024-02-22 | End: 2024-03-29

## 2024-03-29 NOTE — ASSESSMENT
[FreeTextEntry1] : 53-year-old F with history of Bell's Palsy, and with recently diagnosed type 2 diabetes, hypertension, and hyperlipidemia who presents for follow up. The patient was first diagnosed 5 weeks ago and immediately went to [facility] thereafter and was stared on Gliclizide and Atenolol-Nifedipine for HTN and T2DM. Had made significant dietary changes and has been checking his FS and BP at home with a general improvement in readings.  #T2DM - a1c 9.1% 2/2024 - has been on gliclizide, with AM FS ranging from 110-160 - will start on metformin, DC gliclizide - may need 2nd agent, amenable to injectable, may benefit from trulicity vs mounjaro - closely counseled on signs of hyperglycemia/hypoglycemia - podiatry and ophthalmology referrals sent - nutritionist referral sent - started on moderate intensity statin - diabetes supplies sent to pharmacy, instructed to check FS multiple times a day with log  #HLD - significant dyslipidemia and family hx of CAD - statin as above, inc as tolerated  #HTN - BP log reviewed, improved after initiation of atenolol-nifedipine - will switch to losartan 50 mg qd, uptitrate as necessary - BP machine sent to pharmacy  #Bell's Palsy - mild residual defect with L lip drooping, otherwise largely resolved  #HCM - RTC in 5 weeks for follow up and further HCM care

## 2024-03-29 NOTE — HISTORY OF PRESENT ILLNESS
[de-identified] : 53 F with newly diagnosed T2DM and HTN presents for follow up.  Patient last seen 5 weeks ago to establish care, found to have new T2DM, HTN, and HLD. On CMP, found to have markedly elevated glucose, metabolic acidosis, and mildly elevated anion gap. Shortly after her visit, she went to a doctor in [place] and was started on enalapril for HTN and gliclazide for DM. Checked her BP regularly there 2 times a day, was still elevated for several days and saw a pharmacist who switched her over to atenolol/nifedipine. Has seen a reduction in her BP since then, now ranges from 110-150s/80-90s.  Also checked her FS previously when in [place], usually 110-160s in AM. Did not check at other times. Since returning from [place] on 3/27 she has not checked her FS since she does not have a glucometer here.   Otherwise feels well and has no acute complaints.  Has made significant dietary changes since being in [place]. Has cut out soda/juices, junk food, eating primarily vegetables with wheat roti and lean meats.  [FreeTextEntry1] : Follow up

## 2024-03-30 LAB
CREAT SPEC-SCNC: 90 MG/DL
MICROALBUMIN 24H UR DL<=1MG/L-MCNC: 2.5 MG/DL
MICROALBUMIN/CREAT 24H UR-RTO: 28 MG/G

## 2024-04-02 ENCOUNTER — OUTPATIENT (OUTPATIENT)
Dept: OUTPATIENT SERVICES | Facility: HOSPITAL | Age: 54
LOS: 1 days | End: 2024-04-02
Payer: COMMERCIAL

## 2024-04-02 ENCOUNTER — APPOINTMENT (OUTPATIENT)
Dept: INTERNAL MEDICINE | Facility: CLINIC | Age: 54
End: 2024-04-02
Payer: COMMERCIAL

## 2024-04-02 DIAGNOSIS — I10 ESSENTIAL (PRIMARY) HYPERTENSION: ICD-10-CM

## 2024-04-02 PROCEDURE — 97802 MEDICAL NUTRITION INDIV IN: CPT

## 2024-04-03 DIAGNOSIS — E11.9 TYPE 2 DIABETES MELLITUS WITHOUT COMPLICATIONS: ICD-10-CM

## 2024-04-03 DIAGNOSIS — E78.5 HYPERLIPIDEMIA, UNSPECIFIED: ICD-10-CM

## 2024-04-08 DIAGNOSIS — E11.9 TYPE 2 DIABETES MELLITUS WITHOUT COMPLICATIONS: ICD-10-CM

## 2024-05-03 ENCOUNTER — APPOINTMENT (OUTPATIENT)
Dept: INTERNAL MEDICINE | Facility: CLINIC | Age: 54
End: 2024-05-03

## 2024-05-07 ENCOUNTER — APPOINTMENT (OUTPATIENT)
Dept: INTERNAL MEDICINE | Facility: CLINIC | Age: 54
End: 2024-05-07

## 2024-05-07 ENCOUNTER — OUTPATIENT (OUTPATIENT)
Dept: OUTPATIENT SERVICES | Facility: HOSPITAL | Age: 54
LOS: 1 days | End: 2024-05-07
Payer: COMMERCIAL

## 2024-05-07 ENCOUNTER — APPOINTMENT (OUTPATIENT)
Dept: INTERNAL MEDICINE | Facility: CLINIC | Age: 54
End: 2024-05-07
Payer: COMMERCIAL

## 2024-05-07 VITALS
BODY MASS INDEX: 31.01 KG/M2 | HEART RATE: 97 BPM | SYSTOLIC BLOOD PRESSURE: 122 MMHG | WEIGHT: 175 LBS | HEIGHT: 63 IN | DIASTOLIC BLOOD PRESSURE: 80 MMHG | OXYGEN SATURATION: 99 %

## 2024-05-07 DIAGNOSIS — I10 ESSENTIAL (PRIMARY) HYPERTENSION: ICD-10-CM

## 2024-05-07 DIAGNOSIS — E78.5 HYPERLIPIDEMIA, UNSPECIFIED: ICD-10-CM

## 2024-05-07 DIAGNOSIS — E11.9 TYPE 2 DIABETES MELLITUS W/OUT COMPLICATIONS: ICD-10-CM

## 2024-05-07 LAB — HBA1C MFR BLD HPLC: 8.3

## 2024-05-07 PROCEDURE — 99213 OFFICE O/P EST LOW 20 MIN: CPT | Mod: GC

## 2024-05-07 PROCEDURE — 83036 HEMOGLOBIN GLYCOSYLATED A1C: CPT

## 2024-05-07 PROCEDURE — G0463: CPT

## 2024-05-07 RX ORDER — METFORMIN HYDROCHLORIDE 1000 MG/1
1000 TABLET, COATED ORAL TWICE DAILY
Qty: 120 | Refills: 2 | Status: ACTIVE | COMMUNITY
Start: 2024-05-07 | End: 1900-01-01

## 2024-05-08 PROBLEM — I10 ESSENTIAL HYPERTENSION, BENIGN: Status: ACTIVE | Noted: 2024-03-29

## 2024-05-08 PROBLEM — E11.9 TYPE 2 DIABETES MELLITUS WITHOUT COMPLICATION, WITHOUT LONG-TERM CURRENT USE OF INSULIN: Status: ACTIVE | Noted: 2024-03-29

## 2024-05-08 PROBLEM — E78.5 HYPERLIPIDEMIA, UNSPECIFIED HYPERLIPIDEMIA TYPE: Status: ACTIVE | Noted: 2024-03-29

## 2024-05-08 RX ORDER — METFORMIN HYDROCHLORIDE 500 MG/1
500 TABLET, COATED ORAL
Qty: 90 | Refills: 3 | Status: DISCONTINUED | COMMUNITY
Start: 2024-03-29 | End: 2024-05-08

## 2024-05-08 NOTE — HISTORY OF PRESENT ILLNESS
[FreeTextEntry1] : Follow up DM HTN [de-identified] : 53 F with newly diagnosed T2DM and HTN presents for follow up.  Patient last seen 5 weeks ago for follow up. On initial visit early this year found to have new T2DM, HTN, and HLD. On CMP, found to have markedly elevated glucose, metabolic acidosis, and mildly elevated anion gap. Shortly after her visit, she went to a doctor in [place] and was started on enalapril for HTN and gliclazide for DM. Checked her BP regularly there 2 times a day, was still elevated for several days and saw a pharmacist who switched her over to atenolol/nifedipine. Has seen a reduction in her BP since then, now ranges from 110-150s/80-90s. Has made significant dietary changes since being in [place]. Has cut out soda/juices, junk food, eating primarily vegetables with wheat roti and lean meats.   Since her last visit she says her blood sugar and blood pressure have been uncontrolled. The other morning she woke up with a blood sugar of 306. The next day she took an extra metformin before bed and the morning sugar was 194. Spoke to her extensively about options including increasing metformin as well as GLP-1 options. Patient is inherently skeptical of the medical field, says that increasing doses of medications "killed her son." Extensive discussion about risks and benefits of metformin, agrees to increase to 1000 BID for now without addition of another agent. In regard to blood pressure, pressure is at goal today in office. Log shows elevated pressures in morning prior to HTN meds 130s-140s. Otherwise feels well and has no acute complaints.

## 2024-05-08 NOTE — ASSESSMENT
[FreeTextEntry1] : 53-year-old F with history of Bell's Palsy, and with recently diagnosed type 2 diabetes, hypertension, and hyperlipidemia who presents for follow up. A1c mildly improved to 8.3%, patient at this time only amenable to increasing metformin to 1000 BID, may be interested in GLP-1 in future. Will keep losartan the same and add amlodipine for better BP control. Would have liked to check labs including lipids today, patient declined. RTC in three months for BP log review, a1c check, lipids, B12.   #T2DM - a1c 9.1% 2/2024 -> 8.3% today - increase metformin to 1000 BID - may need 2nd agent, amenable to injectable not today but in future, may benefit from trulicity vs mounjaro - podiatry and ophthalmology referrals sent at prior visit - nutritionist referral sent at prior visit - RTC in three months for A1c check, consideration of GLP-1  #HLD - significant dyslipidemia and family hx of CAD - ASCVD 5.07% - Started on atorva 20 per ACC/AHA guidelines  - Patient not amenable to blood work today, check lipids at next visit and increase statin  #HTN - BP log reviewed with some elevated pressures in morning prior to meds 130-140s - Cont losartan 50 - Start amlodipine 5mg  - Review log at next visit  #Bell's Palsy - resolved  #HCM - Patient eligible for PCV20 - declines today, give at next visit - RTC in 3 months for a1c and BP check    Case d/w Dr. Alexis Arechiga, PGY-1

## 2024-05-13 DIAGNOSIS — E11.9 TYPE 2 DIABETES MELLITUS WITHOUT COMPLICATIONS: ICD-10-CM

## 2024-05-13 DIAGNOSIS — E78.5 HYPERLIPIDEMIA, UNSPECIFIED: ICD-10-CM

## 2024-05-14 ENCOUNTER — APPOINTMENT (OUTPATIENT)
Dept: INTERNAL MEDICINE | Facility: CLINIC | Age: 54
End: 2024-05-14

## 2024-06-13 RX ORDER — LOSARTAN POTASSIUM 50 MG/1
50 TABLET, FILM COATED ORAL
Qty: 90 | Refills: 0 | Status: ACTIVE | COMMUNITY
Start: 2024-03-29 | End: 1900-01-01

## 2024-06-13 RX ORDER — AMLODIPINE BESYLATE 5 MG/1
5 TABLET ORAL DAILY
Qty: 90 | Refills: 1 | Status: ACTIVE | COMMUNITY
Start: 2024-05-07 | End: 1900-01-01

## 2024-06-13 RX ORDER — ATORVASTATIN CALCIUM 20 MG/1
20 TABLET, FILM COATED ORAL
Qty: 90 | Refills: 1 | Status: ACTIVE | COMMUNITY
Start: 2024-03-29 | End: 1900-01-01

## 2024-07-02 ENCOUNTER — APPOINTMENT (OUTPATIENT)
Dept: OPHTHALMOLOGY | Facility: CLINIC | Age: 54
End: 2024-07-02

## 2024-09-09 ENCOUNTER — RX RENEWAL (OUTPATIENT)
Age: 54
End: 2024-09-09

## 2024-10-10 NOTE — ED ADULT NURSE NOTE - NS ED NURSE LEVEL OF CONSCIOUSNESS ORIENTATION
leg elevation , iv abx as per ID , septic workup leg elevation , iv abx as per ID , septic workup leg elevation , iv abx as per ID , septic workup Culture - Wound Aerobic (collected 01 Oct 2024 11:00)  Source: Skin/Wound  Final Report (04 Oct 2024 22:25):    Few Methicillin Resistant Staphylococcus aureus    Commensal jameel consistent with body site  Organism: Methicillin resistant Staphylococcus aureus (04 Oct 2024 22:25)  Organism: Methicillin resistant Staphylococcus aureus (04 Oct 2024 22:25) leg elevation , iv abx as per ID , septic workup Culture - Wound Aerobic (collected 01 Oct 2024 11:00)  Source: Skin/Wound  Final Report (04 Oct 2024 22:25):    Few Methicillin Resistant Staphylococcus aureus    Commensal jameel consistent with body site  Organism: Methicillin resistant Staphylococcus aureus (04 Oct 2024 22:25)  Organism: Methicillin resistant Staphylococcus aureus (04 Oct 2024 22:25) leg elevation , iv abx as per ID , septic workup Culture - Wound Aerobic (collected 01 Oct 2024 11:00)  Source: Skin/Wound  Final Report (04 Oct 2024 22:25):    Few Methicillin Resistant Staphylococcus aureus    Commensal jameel consistent with body site  Organism: Methicillin resistant Staphylococcus aureus (04 Oct 2024 22:25)  Organism: Methicillin resistant Staphylococcus aureus (04 Oct 2024 22:25) leg elevation , iv abx as per ID , septic workup leg elevation , iv abx as per ID , septic workup Oriented - self; Oriented - place; Oriented - time leg elevation , iv abx as per ID , septic workup Culture - Wound Aerobic (collected 01 Oct 2024 11:00)  Source: Skin/Wound  Final Report (04 Oct 2024 22:25):    Few Methicillin Resistant Staphylococcus aureus    Commensal jameel consistent with body site  Organism: Methicillin resistant Staphylococcus aureus (04 Oct 2024 22:25)  Organism: Methicillin resistant Staphylococcus aureus (04 Oct 2024 22:25)
